# Patient Record
Sex: MALE | Race: WHITE | Employment: UNEMPLOYED | URBAN - METROPOLITAN AREA
[De-identification: names, ages, dates, MRNs, and addresses within clinical notes are randomized per-mention and may not be internally consistent; named-entity substitution may affect disease eponyms.]

---

## 2018-09-19 ENCOUNTER — HOSPITAL ENCOUNTER (INPATIENT)
Facility: CLINIC | Age: 38
LOS: 2 days | Discharge: HOME | End: 2018-09-22
Attending: EMERGENCY MEDICINE | Admitting: HOSPITALIST
Payer: MEDICAID

## 2018-09-19 DIAGNOSIS — F10.239 ALCOHOL DEPENDENCE WITH WITHDRAWAL WITH COMPLICATION (H): ICD-10-CM

## 2018-09-19 PROCEDURE — 84100 ASSAY OF PHOSPHORUS: CPT | Performed by: EMERGENCY MEDICINE

## 2018-09-19 PROCEDURE — 85025 COMPLETE CBC W/AUTO DIFF WBC: CPT | Performed by: EMERGENCY MEDICINE

## 2018-09-19 PROCEDURE — 84443 ASSAY THYROID STIM HORMONE: CPT | Performed by: EMERGENCY MEDICINE

## 2018-09-19 PROCEDURE — 84481 FREE ASSAY (FT-3): CPT | Performed by: EMERGENCY MEDICINE

## 2018-09-19 PROCEDURE — 84439 ASSAY OF FREE THYROXINE: CPT | Performed by: EMERGENCY MEDICINE

## 2018-09-19 PROCEDURE — 80320 DRUG SCREEN QUANTALCOHOLS: CPT | Performed by: EMERGENCY MEDICINE

## 2018-09-19 PROCEDURE — 99285 EMERGENCY DEPT VISIT HI MDM: CPT | Mod: 25

## 2018-09-19 PROCEDURE — 80053 COMPREHEN METABOLIC PANEL: CPT | Performed by: EMERGENCY MEDICINE

## 2018-09-19 PROCEDURE — 83735 ASSAY OF MAGNESIUM: CPT | Performed by: EMERGENCY MEDICINE

## 2018-09-19 NOTE — IP AVS SNAPSHOT
[facility]    [address] [place] [address]    Phone:  [phone]                                       After Visit Summary   9/19/2018    Shabbir Antonio    MRN: [id number]           After Visit Summary Signature Page     I have received my discharge instructions, and my questions have been answered. I have discussed any challenges I see with this plan with the nurse or doctor.    ..........................................................................................................................................  Patient/Patient Representative Signature      ..........................................................................................................................................  Patient Representative Print Name and Relationship to Patient    ..................................................               ................................................  Date                                   Time    ..........................................................................................................................................  Reviewed by Signature/Title    ...................................................              ..............................................  Date                                               Time          22EPIC Rev 08/18

## 2018-09-20 ENCOUNTER — APPOINTMENT (OUTPATIENT)
Dept: GENERAL RADIOLOGY | Facility: CLINIC | Age: 38
End: 2018-09-20
Attending: HOSPITALIST
Payer: MEDICAID

## 2018-09-20 PROBLEM — F10.932 ALCOHOL WITHDRAWAL HALLUCINOSIS (H): Status: ACTIVE | Noted: 2018-09-20

## 2018-09-20 LAB
ALBUMIN SERPL-MCNC: 4.5 G/DL (ref 3.4–5)
ALP SERPL-CCNC: 62 U/L (ref 40–150)
ALT SERPL W P-5'-P-CCNC: 74 U/L (ref 0–70)
AMPHETAMINES UR QL SCN: NEGATIVE
ANION GAP SERPL CALCULATED.3IONS-SCNC: 7 MMOL/L (ref 3–14)
ANION GAP SERPL CALCULATED.3IONS-SCNC: 9 MMOL/L (ref 3–14)
AST SERPL W P-5'-P-CCNC: 67 U/L (ref 0–45)
BARBITURATES UR QL: NEGATIVE
BASOPHILS # BLD AUTO: 0.1 10E9/L (ref 0–0.2)
BASOPHILS NFR BLD AUTO: 0.5 %
BENZODIAZ UR QL: NEGATIVE
BILIRUB SERPL-MCNC: 0.9 MG/DL (ref 0.2–1.3)
BUN SERPL-MCNC: 9 MG/DL (ref 7–30)
CALCIUM SERPL-MCNC: 9.1 MG/DL (ref 8.5–10.1)
CANNABINOIDS UR QL SCN: NEGATIVE
CHLORIDE SERPL-SCNC: 101 MMOL/L (ref 94–109)
CHLORIDE SERPL-SCNC: 105 MMOL/L (ref 94–109)
CO2 SERPL-SCNC: 27 MMOL/L (ref 20–32)
CO2 SERPL-SCNC: 29 MMOL/L (ref 20–32)
COCAINE UR QL: NEGATIVE
CREAT SERPL-MCNC: 1.04 MG/DL (ref 0.66–1.25)
CREAT SERPL-MCNC: 1.1 MG/DL (ref 0.66–1.25)
DIFFERENTIAL METHOD BLD: ABNORMAL
EOSINOPHIL # BLD AUTO: 0.3 10E9/L (ref 0–0.7)
EOSINOPHIL NFR BLD AUTO: 2.5 %
ERYTHROCYTE [DISTWIDTH] IN BLOOD BY AUTOMATED COUNT: 12.3 % (ref 10–15)
ETHANOL SERPL-MCNC: <0.01 G/DL
GFR SERPL CREATININE-BSD FRML MDRD: 74 ML/MIN/1.7M2
GFR SERPL CREATININE-BSD FRML MDRD: 79 ML/MIN/1.7M2
GLUCOSE SERPL-MCNC: 109 MG/DL (ref 70–99)
HCT VFR BLD AUTO: 46.3 % (ref 40–53)
HGB BLD-MCNC: 16.5 G/DL (ref 13.3–17.7)
IMM GRANULOCYTES # BLD: 0 10E9/L (ref 0–0.4)
IMM GRANULOCYTES NFR BLD: 0.3 %
LYMPHOCYTES # BLD AUTO: 3 10E9/L (ref 0.8–5.3)
LYMPHOCYTES NFR BLD AUTO: 25.1 %
MAGNESIUM SERPL-MCNC: 1.9 MG/DL (ref 1.6–2.3)
MCH RBC QN AUTO: 33.9 PG (ref 26.5–33)
MCHC RBC AUTO-ENTMCNC: 35.6 G/DL (ref 31.5–36.5)
MCV RBC AUTO: 95 FL (ref 78–100)
MONOCYTES # BLD AUTO: 0.8 10E9/L (ref 0–1.3)
MONOCYTES NFR BLD AUTO: 6.5 %
NEUTROPHILS # BLD AUTO: 7.7 10E9/L (ref 1.6–8.3)
NEUTROPHILS NFR BLD AUTO: 65.1 %
NRBC # BLD AUTO: 0 10*3/UL
NRBC BLD AUTO-RTO: 0 /100
OPIATES UR QL SCN: NEGATIVE
PCP UR QL SCN: NEGATIVE
PHOSPHATE SERPL-MCNC: 3.2 MG/DL (ref 2.5–4.5)
PLATELET # BLD AUTO: 129 10E9/L (ref 150–450)
PLATELET # BLD AUTO: 200 10E9/L (ref 150–450)
POTASSIUM SERPL-SCNC: 3.5 MMOL/L (ref 3.4–5.3)
POTASSIUM SERPL-SCNC: 3.6 MMOL/L (ref 3.4–5.3)
PROT SERPL-MCNC: 8.6 G/DL (ref 6.8–8.8)
RBC # BLD AUTO: 4.87 10E12/L (ref 4.4–5.9)
SODIUM SERPL-SCNC: 137 MMOL/L (ref 133–144)
SODIUM SERPL-SCNC: 141 MMOL/L (ref 133–144)
T3FREE SERPL-MCNC: 4.4 PG/ML (ref 2.3–4.2)
T4 FREE SERPL-MCNC: 0.84 NG/DL (ref 0.76–1.46)
TSH SERPL DL<=0.005 MIU/L-ACNC: 7.13 MU/L (ref 0.4–4)
WBC # BLD AUTO: 11.8 10E9/L (ref 4–11)

## 2018-09-20 PROCEDURE — 25000132 ZZH RX MED GY IP 250 OP 250 PS 637: Performed by: HOSPITALIST

## 2018-09-20 PROCEDURE — 80307 DRUG TEST PRSMV CHEM ANLYZR: CPT | Performed by: HOSPITALIST

## 2018-09-20 PROCEDURE — 96361 HYDRATE IV INFUSION ADD-ON: CPT

## 2018-09-20 PROCEDURE — 25000132 ZZH RX MED GY IP 250 OP 250 PS 637: Performed by: EMERGENCY MEDICINE

## 2018-09-20 PROCEDURE — 25000128 H RX IP 250 OP 636: Performed by: EMERGENCY MEDICINE

## 2018-09-20 PROCEDURE — 93010 ELECTROCARDIOGRAM REPORT: CPT | Performed by: INTERNAL MEDICINE

## 2018-09-20 PROCEDURE — 80051 ELECTROLYTE PANEL: CPT | Performed by: HOSPITALIST

## 2018-09-20 PROCEDURE — 96374 THER/PROPH/DIAG INJ IV PUSH: CPT

## 2018-09-20 PROCEDURE — 99223 1ST HOSP IP/OBS HIGH 75: CPT | Mod: AI | Performed by: HOSPITALIST

## 2018-09-20 PROCEDURE — 85049 AUTOMATED PLATELET COUNT: CPT | Performed by: HOSPITALIST

## 2018-09-20 PROCEDURE — 12000000 ZZH R&B MED SURG/OB

## 2018-09-20 PROCEDURE — 36415 COLL VENOUS BLD VENIPUNCTURE: CPT | Performed by: HOSPITALIST

## 2018-09-20 PROCEDURE — 82565 ASSAY OF CREATININE: CPT | Performed by: HOSPITALIST

## 2018-09-20 PROCEDURE — 25000128 H RX IP 250 OP 636: Performed by: HOSPITALIST

## 2018-09-20 PROCEDURE — 93005 ELECTROCARDIOGRAM TRACING: CPT

## 2018-09-20 PROCEDURE — 71046 X-RAY EXAM CHEST 2 VIEWS: CPT

## 2018-09-20 PROCEDURE — 96375 TX/PRO/DX INJ NEW DRUG ADDON: CPT

## 2018-09-20 PROCEDURE — 96376 TX/PRO/DX INJ SAME DRUG ADON: CPT

## 2018-09-20 PROCEDURE — 99221 1ST HOSP IP/OBS SF/LOW 40: CPT | Performed by: PSYCHIATRY & NEUROLOGY

## 2018-09-20 RX ORDER — BISACODYL 10 MG
10 SUPPOSITORY, RECTAL RECTAL DAILY PRN
Status: DISCONTINUED | OUTPATIENT
Start: 2018-09-20 | End: 2018-09-22 | Stop reason: HOSPADM

## 2018-09-20 RX ORDER — SODIUM CHLORIDE 9 MG/ML
INJECTION, SOLUTION INTRAVENOUS CONTINUOUS
Status: DISCONTINUED | OUTPATIENT
Start: 2018-09-20 | End: 2018-09-20

## 2018-09-20 RX ORDER — MULTIPLE VITAMINS W/ MINERALS TAB 9MG-400MCG
1 TAB ORAL DAILY
Status: DISCONTINUED | OUTPATIENT
Start: 2018-09-20 | End: 2018-09-22 | Stop reason: HOSPADM

## 2018-09-20 RX ORDER — FOLIC ACID 1 MG/1
1 TABLET ORAL DAILY
Status: DISCONTINUED | OUTPATIENT
Start: 2018-09-20 | End: 2018-09-22 | Stop reason: HOSPADM

## 2018-09-20 RX ORDER — PROCHLORPERAZINE 25 MG
25 SUPPOSITORY, RECTAL RECTAL EVERY 12 HOURS PRN
Status: DISCONTINUED | OUTPATIENT
Start: 2018-09-20 | End: 2018-09-20

## 2018-09-20 RX ORDER — POTASSIUM CHLORIDE 7.45 MG/ML
10 INJECTION INTRAVENOUS
Status: DISCONTINUED | OUTPATIENT
Start: 2018-09-20 | End: 2018-09-22 | Stop reason: HOSPADM

## 2018-09-20 RX ORDER — ONDANSETRON 2 MG/ML
4 INJECTION INTRAMUSCULAR; INTRAVENOUS ONCE
Status: DISCONTINUED | OUTPATIENT
Start: 2018-09-20 | End: 2018-09-20

## 2018-09-20 RX ORDER — ENALAPRIL MALEATE 20 MG/1
20 TABLET ORAL DAILY
COMMUNITY

## 2018-09-20 RX ORDER — LIDOCAINE 40 MG/G
CREAM TOPICAL
Status: DISCONTINUED | OUTPATIENT
Start: 2018-09-20 | End: 2018-09-22 | Stop reason: HOSPADM

## 2018-09-20 RX ORDER — LORAZEPAM 2 MG/ML
2 INJECTION INTRAMUSCULAR
Status: DISCONTINUED | OUTPATIENT
Start: 2018-09-20 | End: 2018-09-20

## 2018-09-20 RX ORDER — POTASSIUM CHLORIDE 1.5 G/1.58G
20-40 POWDER, FOR SOLUTION ORAL
Status: DISCONTINUED | OUTPATIENT
Start: 2018-09-20 | End: 2018-09-22 | Stop reason: HOSPADM

## 2018-09-20 RX ORDER — DIAZEPAM 10 MG/2ML
5-10 INJECTION, SOLUTION INTRAMUSCULAR; INTRAVENOUS EVERY 30 MIN PRN
Status: DISCONTINUED | OUTPATIENT
Start: 2018-09-20 | End: 2018-09-22 | Stop reason: HOSPADM

## 2018-09-20 RX ORDER — LANOLIN ALCOHOL/MO/W.PET/CERES
100 CREAM (GRAM) TOPICAL ONCE
Status: COMPLETED | OUTPATIENT
Start: 2018-09-20 | End: 2018-09-20

## 2018-09-20 RX ORDER — ONDANSETRON 2 MG/ML
INJECTION INTRAMUSCULAR; INTRAVENOUS
Status: DISCONTINUED
Start: 2018-09-20 | End: 2018-09-20 | Stop reason: WASHOUT

## 2018-09-20 RX ORDER — MAGNESIUM SULFATE HEPTAHYDRATE 40 MG/ML
4 INJECTION, SOLUTION INTRAVENOUS EVERY 4 HOURS PRN
Status: DISCONTINUED | OUTPATIENT
Start: 2018-09-20 | End: 2018-09-22 | Stop reason: HOSPADM

## 2018-09-20 RX ORDER — FLUOXETINE 10 MG/1
10 CAPSULE ORAL DAILY
Status: DISCONTINUED | OUTPATIENT
Start: 2018-09-21 | End: 2018-09-22 | Stop reason: HOSPADM

## 2018-09-20 RX ORDER — MULTIPLE VITAMINS W/ MINERALS TAB 9MG-400MCG
1 TAB ORAL ONCE
Status: COMPLETED | OUTPATIENT
Start: 2018-09-20 | End: 2018-09-20

## 2018-09-20 RX ORDER — POTASSIUM CHLORIDE 1500 MG/1
20-40 TABLET, EXTENDED RELEASE ORAL
Status: DISCONTINUED | OUTPATIENT
Start: 2018-09-20 | End: 2018-09-22 | Stop reason: HOSPADM

## 2018-09-20 RX ORDER — MAGNESIUM SULFATE HEPTAHYDRATE 40 MG/ML
2 INJECTION, SOLUTION INTRAVENOUS DAILY PRN
Status: DISCONTINUED | OUTPATIENT
Start: 2018-09-20 | End: 2018-09-22 | Stop reason: HOSPADM

## 2018-09-20 RX ORDER — AMOXICILLIN 250 MG
1 CAPSULE ORAL 2 TIMES DAILY PRN
Status: DISCONTINUED | OUTPATIENT
Start: 2018-09-20 | End: 2018-09-22 | Stop reason: HOSPADM

## 2018-09-20 RX ORDER — FLUOXETINE 10 MG/1
10 CAPSULE ORAL DAILY
COMMUNITY

## 2018-09-20 RX ORDER — AMOXICILLIN 250 MG
2 CAPSULE ORAL 2 TIMES DAILY PRN
Status: DISCONTINUED | OUTPATIENT
Start: 2018-09-20 | End: 2018-09-22 | Stop reason: HOSPADM

## 2018-09-20 RX ORDER — FOLIC ACID 1 MG/1
1 TABLET ORAL ONCE
Status: COMPLETED | OUTPATIENT
Start: 2018-09-20 | End: 2018-09-20

## 2018-09-20 RX ORDER — ACETAMINOPHEN 325 MG/1
650 TABLET ORAL EVERY 4 HOURS PRN
Status: DISCONTINUED | OUTPATIENT
Start: 2018-09-20 | End: 2018-09-22 | Stop reason: HOSPADM

## 2018-09-20 RX ORDER — NALOXONE HYDROCHLORIDE 0.4 MG/ML
.1-.4 INJECTION, SOLUTION INTRAMUSCULAR; INTRAVENOUS; SUBCUTANEOUS
Status: DISCONTINUED | OUTPATIENT
Start: 2018-09-20 | End: 2018-09-22 | Stop reason: HOSPADM

## 2018-09-20 RX ORDER — LANOLIN ALCOHOL/MO/W.PET/CERES
100 CREAM (GRAM) TOPICAL DAILY
Status: DISCONTINUED | OUTPATIENT
Start: 2018-09-20 | End: 2018-09-22 | Stop reason: HOSPADM

## 2018-09-20 RX ORDER — ONDANSETRON 4 MG/1
4 TABLET, ORALLY DISINTEGRATING ORAL EVERY 6 HOURS PRN
Status: DISCONTINUED | OUTPATIENT
Start: 2018-09-20 | End: 2018-09-22 | Stop reason: HOSPADM

## 2018-09-20 RX ORDER — DIAZEPAM 5 MG
10 TABLET ORAL EVERY 30 MIN PRN
Status: DISCONTINUED | OUTPATIENT
Start: 2018-09-20 | End: 2018-09-22 | Stop reason: HOSPADM

## 2018-09-20 RX ORDER — POTASSIUM CHLORIDE 29.8 MG/ML
20 INJECTION INTRAVENOUS
Status: DISCONTINUED | OUTPATIENT
Start: 2018-09-20 | End: 2018-09-22 | Stop reason: HOSPADM

## 2018-09-20 RX ORDER — LORAZEPAM 2 MG/ML
2 INJECTION INTRAMUSCULAR ONCE
Status: COMPLETED | OUTPATIENT
Start: 2018-09-20 | End: 2018-09-20

## 2018-09-20 RX ORDER — SODIUM CHLORIDE, SODIUM LACTATE, POTASSIUM CHLORIDE, CALCIUM CHLORIDE 600; 310; 30; 20 MG/100ML; MG/100ML; MG/100ML; MG/100ML
1000 INJECTION, SOLUTION INTRAVENOUS CONTINUOUS
Status: DISCONTINUED | OUTPATIENT
Start: 2018-09-20 | End: 2018-09-21

## 2018-09-20 RX ORDER — POTASSIUM CL/LIDO/0.9 % NACL 10MEQ/0.1L
10 INTRAVENOUS SOLUTION, PIGGYBACK (ML) INTRAVENOUS
Status: DISCONTINUED | OUTPATIENT
Start: 2018-09-20 | End: 2018-09-22 | Stop reason: HOSPADM

## 2018-09-20 RX ORDER — PROCHLORPERAZINE MALEATE 5 MG
10 TABLET ORAL EVERY 6 HOURS PRN
Status: DISCONTINUED | OUTPATIENT
Start: 2018-09-20 | End: 2018-09-20

## 2018-09-20 RX ORDER — QUETIAPINE FUMARATE 25 MG/1
25-50 TABLET, FILM COATED ORAL EVERY 6 HOURS PRN
Status: DISCONTINUED | OUTPATIENT
Start: 2018-09-20 | End: 2018-09-22 | Stop reason: HOSPADM

## 2018-09-20 RX ORDER — ONDANSETRON 2 MG/ML
4 INJECTION INTRAMUSCULAR; INTRAVENOUS EVERY 6 HOURS PRN
Status: DISCONTINUED | OUTPATIENT
Start: 2018-09-20 | End: 2018-09-22 | Stop reason: HOSPADM

## 2018-09-20 RX ADMIN — MAGNESIUM SULFATE IN WATER 2 G: 40 INJECTION, SOLUTION INTRAVENOUS at 11:14

## 2018-09-20 RX ADMIN — LORAZEPAM 2 MG: 2 INJECTION INTRAMUSCULAR; INTRAVENOUS at 00:36

## 2018-09-20 RX ADMIN — FOLIC ACID 1 MG: 1 TABLET ORAL at 00:35

## 2018-09-20 RX ADMIN — QUETIAPINE FUMARATE 25 MG: 25 TABLET ORAL at 20:44

## 2018-09-20 RX ADMIN — LORAZEPAM 2 MG: 2 INJECTION INTRAMUSCULAR; INTRAVENOUS at 02:03

## 2018-09-20 RX ADMIN — Medication 100 MG: at 00:35

## 2018-09-20 RX ADMIN — Medication 100 MG: at 08:54

## 2018-09-20 RX ADMIN — SODIUM CHLORIDE, POTASSIUM CHLORIDE, SODIUM LACTATE AND CALCIUM CHLORIDE 1000 ML: 600; 310; 30; 20 INJECTION, SOLUTION INTRAVENOUS at 00:36

## 2018-09-20 RX ADMIN — MULTIPLE VITAMINS W/ MINERALS TAB 1 TABLET: TAB at 00:35

## 2018-09-20 RX ADMIN — ENOXAPARIN SODIUM 40 MG: 40 INJECTION SUBCUTANEOUS at 08:55

## 2018-09-20 RX ADMIN — POTASSIUM CHLORIDE 20 MEQ: 1500 TABLET, EXTENDED RELEASE ORAL at 09:01

## 2018-09-20 RX ADMIN — FOLIC ACID 1 MG: 1 TABLET ORAL at 08:54

## 2018-09-20 RX ADMIN — MULTIPLE VITAMINS W/ MINERALS TAB 1 TABLET: TAB at 08:54

## 2018-09-20 RX ADMIN — SODIUM CHLORIDE: 9 INJECTION, SOLUTION INTRAVENOUS at 03:45

## 2018-09-20 ASSESSMENT — ENCOUNTER SYMPTOMS
HALLUCINATIONS: 1
VOMITING: 0
NAUSEA: 1

## 2018-09-20 ASSESSMENT — LIFESTYLE VARIABLES
ANXIETY: NO ANXIETY, AT EASE
AGITATION: NORMAL ACTIVITY
NAUSEA AND VOMITING: NO NAUSEA AND NO VOMITING
TOTAL SCORE: 0
VISUAL DISTURBANCES: NOT PRESENT
TREMOR: NO TREMOR
AUDITORY DISTURBANCES: NOT PRESENT
HEADACHE, FULLNESS IN HEAD: NONE PRESENT
PAROXYSMAL SWEATS: NO SWEAT VISIBLE
ORIENTATION AND CLOUDING OF SENSORIUM: ORIENTED AND CAN DO SERIAL ADDITIONS

## 2018-09-20 ASSESSMENT — ACTIVITIES OF DAILY LIVING (ADL)
ADLS_ACUITY_SCORE: 10

## 2018-09-20 NOTE — ED NOTES
Pt was transported to floor via ERT via w/c. Pt's belongings were also transported with the pt to the floor and pt's paperwork as well.

## 2018-09-20 NOTE — PLAN OF CARE
Problem: Patient Care Overview  Goal: Plan of Care/Patient Progress Review  Outcome: Improving  Patient had VSS, denied any chest pain and SOB. Patient's CIWA scores were 1 and 1, for slight tremors. Denies any hallucinations at this time. Patient had CXR. K+, Magnesium replaced. Recheck tomorrow AM. SBA/FR. Continue to monitor.

## 2018-09-20 NOTE — ED NOTES
[facility]  ED Nurse Handoff Report    ED Chief complaint: Hallucinations (Pt has been drinking for past few days, states he is having seizure like acitivity and hallucinations. Unwilling to share hallucinations. )      ED Diagnosis:   Final diagnoses:   Alcohol dependence with withdrawal with complication (H)       Code Status: Full Code    Allergies: No Known Allergies    Activity level - Baseline/Home:  Independent    Activity Level - Current:   Stand with Assist     Needed?: No    Isolation: No  Infection: Not Applicable  Bariatric?: No    Vital Signs:   Vitals:    09/20/18 0039 09/20/18 0043 09/20/18 0044 09/20/18 0124   BP:  137/89     Resp: 17  14 16   Temp:       TempSrc:       SpO2: 97%  97% 97%   Weight:       Height:           Cardiac Rhythm: ,        Pain level:      Is this patient confused?: No   [place] - Suicide Severity Rating Scale Completed?  Yes  If yes, what color did the patient score?  White    Patient Report: Initial Complaint: Pt has been drinking for past few days, states he is having seizure like acitivity and hallucinations. Unwilling to share hallucinations  Focused Assessment: alcohol withdrawal, visual hallucinations  Tests Performed: labs,   Abnormal Results:   Results for orders placed or performed during the hospital encounter of 09/19/18 (from the past 24 hour(s))   Alcohol ethyl   Result Value Ref Range    Ethanol g/dL <0.01 <0.01 g/dL   CBC with platelets differential   Result Value Ref Range    WBC 11.8 (H) 4.0 - 11.0 10e9/L    RBC Count 4.87 4.4 - 5.9 10e12/L    Hemoglobin 16.5 13.3 - 17.7 g/dL    Hematocrit 46.3 40.0 - 53.0 %    MCV 95 78 - 100 fl    MCH 33.9 (H) 26.5 - 33.0 pg    MCHC 35.6 31.5 - 36.5 g/dL    RDW 12.3 10.0 - 15.0 %    Platelet Count 200 150 - 450 10e9/L    Diff Method Automated Method     % Neutrophils 65.1 %    % Lymphocytes 25.1 %    % Monocytes 6.5 %    % Eosinophils 2.5 %    % Basophils 0.5 %    % Immature Granulocytes 0.3 %     Nucleated RBCs 0 0 /100    Absolute Neutrophil 7.7 1.6 - 8.3 10e9/L    Absolute Lymphocytes 3.0 0.8 - 5.3 10e9/L    Absolute Monocytes 0.8 0.0 - 1.3 10e9/L    Absolute Eosinophils 0.3 0.0 - 0.7 10e9/L    Absolute Basophils 0.1 0.0 - 0.2 10e9/L    Abs Immature Granulocytes 0.0 0 - 0.4 10e9/L    Absolute Nucleated RBC 0.0    Comprehensive metabolic panel   Result Value Ref Range    Sodium 137 133 - 144 mmol/L    Potassium 3.5 3.4 - 5.3 mmol/L    Chloride 101 94 - 109 mmol/L    Carbon Dioxide 27 20 - 32 mmol/L    Anion Gap 9 3 - 14 mmol/L    Glucose 109 (H) 70 - 99 mg/dL    Urea Nitrogen 9 7 - 30 mg/dL    Creatinine 1.10 0.66 - 1.25 mg/dL    GFR Estimate 74 >60 mL/min/1.7m2    GFR Estimate If Black 90 >60 mL/min/1.7m2    Calcium 9.1 8.5 - 10.1 mg/dL    Bilirubin Total 0.9 0.2 - 1.3 mg/dL    Albumin 4.5 3.4 - 5.0 g/dL    Protein Total 8.6 6.8 - 8.8 g/dL    Alkaline Phosphatase 62 40 - 150 U/L    ALT 74 (H) 0 - 70 U/L    AST 67 (H) 0 - 45 U/L     Treatments provided: ativan 2mg x2, folic acid, thiamine, vitamin b, 1 liter LR.  Pt denies feeling suicidal or homicidal. Pt is calm and cooperative    Family Comments:     OBS brochure/video discussed/provided to patient: N/A    ED Medications:   Medications   lactated ringers BOLUS 1,000 mL (0 mLs Intravenous Stopped 9/20/18 0127)     Followed by   lactated ringers infusion (not administered)   LORazepam (ATIVAN) injection 2 mg (2 mg Intravenous Given 9/20/18 0036)   LORazepam (ATIVAN) injection 2 mg (not administered)   thiamine tablet 100 mg (100 mg Oral Given 9/20/18 0035)   folic acid (FOLVITE) tablet 1 mg (1 mg Oral Given 9/20/18 0035)   multivitamin, therapeutic with minerals (THERA-VIT-M) tablet 1 tablet (1 tablet Oral Given 9/20/18 0035)       Drips infusing?:  No    For the majority of the shift this patient was Green.   Interventions performed were .    Severe Sepsis OR Septic Shock Diagnosis Present: No      ED NURSE PHONE NUMBER: [phone]

## 2018-09-20 NOTE — PROGRESS NOTES
SW:  D:  Psychiatry note reviewed.  Writer has left a message with Domitila in the [facility] Office asking her to check if patient's [place] [employer] is still active.  Once this is known writer will either ask CD to see or give patient Rule 25 information.

## 2018-09-20 NOTE — ED PROVIDER NOTES
"  History     Chief Complaint:  Hallucinations       HPI   Shabbir Antonio is a 38 year old male who presents with hallucinations. The patient states that he moved to [place] from [place] recently. 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 Typically the patient states that he drinks a few beers and liquor every couple of days. However, over the last few days he has been drinking more. His last drink was a half of a beer just prior to presentation. He came to the ED today as he has been having \"visual distortions\" and has been hearing \"musical sound.\" The patient states that he has been anxious and is concerned that he is in withdrawal. The patient has had slight nausea but denies any vomiting. He has never attended treatment for his alcohol use in the past. The patient notes that he does have one close friend in the area and has spoken to them today about attending AA. The patient began smoking a few months ago and did use marijuana in the past, but denies any recent use or other illicit drug use. He denies any suicidal or homicidal ideation.     Allergies:  No known drug allergies.     Medications:    Enalapril Maleate  Prozac     Past Medical History:    History reviewed.  No significant past medical history.      Past Surgical History:    History reviewed. No pertinent past surgical history.     Family History:    History reviewed. No pertinent family history.     Social History:  Marital Status: Single  Presents to the ED alone  Tobacco Use: Yes  Alcohol Use: Yes     Review of Systems   Gastrointestinal: Positive for nausea. Negative for vomiting.   Neurological: Negative for syncope.   Psychiatric/Behavioral: Positive for hallucinations. Negative for suicidal ideas.   All other systems reviewed and are negative.      Physical Exam   First Vitals:  BP: (!) 193/177  Heart Rate: 117  Temp: " "98.5  F (36.9  C)  Resp: 16  Height: 185.4 cm (6' 1\")  Weight: 97.1 kg (214 lb)  SpO2: 100 %      Physical Exam  Gen: Pleasant, appears stated age.    Eye:   Pupils are equal, round, and reactive.     Sclera non-injected.    ENT:   Moist mucus membranes.     Normal tongue.    Oropharynx without lesions.   Tongue fasciculations.    Cardiac:     Tachycardic but regular rhythm.    No murmurs, gallops, or rubs.    Pulmonary:     Clear to auscultation bilaterally.    No wheezes, rales, or rhonchi.    Abdomen:     Normal active bowel sounds.     Abdomen is soft and non-distended, without focal tenderness.    Musculoskeletal:     Normal movement of all extremities without evidence for deficit.    Extremities:    No edema.    Skin:   Warm and dry.    Neurologic:    Non-focal exam without asymmetric weakness or numbness.    Fine tremors.    Psychiatric:     Anxious.    Emergency Department Course   Laboratory:  CBC:  WBC 11.8 (H), HGB 16.5,    CMP: Glucose 109 (H), AST 67 (H), ALT 74 (H), otherwise WNL (Creatinine 1.10)   (2349) Alcohol Ethyl: <0.01    Interventions:  (0035) Thiamine 100 mg, PO  (0035) Folvite, 1 mg, PO   (0035) Multivitamin, 1 Tablet, PO   (0036) Normal Saline, 1 liter, IV bolus   (0036) Ativan, 2 mg, IV   (0203) Ativan, 2 mg, IV     Emergency Department Course:  Nursing notes and vitals reviewed.  (0025) I performed an exam of the patient as documented above.    A peripheral IV was established. Blood was drawn from the patient. This was sent for laboratory testing, findings above.    (0147) I rechecked on the patient. Heart rate is improved, but he is still complaining of some visual hallucinations.   Findings and plan explained to the patient who consents to admission.   (0159) I discussed the patient with Dr. Leon of the hospitalist service, who will admit the patient to a telemetry bed for further monitoring, evaluation, and treatment.     Impression & Plan      Medical Decision Making:  Shabbir" Faustino Antonio is a 38 year old male with a history of chronic alcohol abuse who presents with  Hallucinations and tremors. On exam, the patient is tachycardic and hypertensive. He has other exam findings to suggest acute alcohol withdrawal. At this stage, he seems to have partially responded to IV ativan though continues to say that he is having some hallucinations and tremulous. Vitals are improved. He is still awake and sating well after 2 mg of ativan. Additional ativan was ordered given persistent symptoms. Otherwise, patient has mild transaminitis likely related to chronic alcohol abuse. He will be admitted for continued treatment of alcohol withdrawal. He is stable for a [facility] bed at this time.        Diagnosis:    ICD-10-CM    1. Alcohol dependence with withdrawal with complication (H) F10.239        Disposition:  Admitted to inpatient medical bed.          Trina PRESSLEY, am serving as a scribe on 9/20/2018 at 12:25 AM to personally document services performed by Dr. Eckert based on my observations and the provider's statements to me.     9/19/2018    EMERGENCY DEPARTMENT       Sherly Eckert MD  09/20/18 0252

## 2018-09-20 NOTE — CONSULTS
"Consult Date:  09/20/2018      REASON FOR CONSULTATION:  Hallucinations, in the context of alcohol withdrawal.      REQUESTING PHYSICIAN:  Prakash Leon MD      IDENTIFYING DATA:  The patient is a 38-year-old, single,  male, who recently moved back to [place] from [place] and came in acknowledging auditory and visual hallucinations in the context of heavy drinking and possible withdrawal.      CHIEF COMPLAINT:  \"I have been drinking on and off.\"  The patient is a 38-year-old gentleman with a notable history of an untreated alcohol use disorder.  He has been living in the [facility], having moved back to [place] to look for work.  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  He says he stopped drinking the day prior to admission and became tremulous and started having visual and auditory hallucinations.  He was given appropriate doses of thiamine and lorazepam in the emergency room.  He was oriented there.  His labs show alcohol-induced hepatitis, but no other gross abnormalities.  He denies other drug use.  He did mention that he is treated for anxiety and had been taking a small amount of Prozac for this.  It sounds like he drinks hard liquor and beer.  He denies DWIs and says he has never had any formal treatment.  He mentioned that he might have [place] [employer] insurance, but he is not sure.  No insurance is listed on his face sheet.      On further questioning, he denies a convincing history of hypomania, anabelle, panic disorder, obsessive-compulsive disorder, eating disorder history, PTSD, ADHD, or trauma history.  He alludes to having hallucinations, of both visual and auditory nature, where he was hearing music and seeing people arguing and fighting.  When I came into the room, he was sleeping very soundly, having received some benzodiazepines in the ER.  He woke up with a startle, was quite " confused but then able to compose himself and converse with me.  He is currently help seeking.  He did get a total of 4 mg IV lorazepam in the ER, along with normal saline.  He looks a little diaphoretic and shaky, also disheveled.  He is not reporting overt concerns about depression and denies any thoughts of self-harm.      PAST PSYCHIATRIC HISTORY:  He notes being treated for some mild anxiety, using 10 mg of Prozac, which he says was historically helpful.      PAST CHEMICAL DEPENDENCY HISTORY:  Per his admission, he is a heavy drinker, but never went through treatment.  He has been trying to get himself off of alcohol, but started having withdrawal symptoms and hallucinations, which prompted him to come to the ER.  This has happened in the past after he stopped drinking, by his report.      PAST MEDICAL HISTORY:  Notable for hypertension.      PRIOR TO ADMISSION MEDICATIONS:   1.  Prozac 10 mg daily.   2.  Enalapril.      FAMILY HISTORY:  He thinks there are first-degree relatives with alcoholism, but he denies other mental illness.      SOCIAL HISTORY:  The patient is originally from [place].  It sounds like he may have moved a bit and says that he now lives independently in the [facility].  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  I do not believe he has been .  He denies legal problems, denies  history, and does not have any children.  He mentioned having one sister.  It is not entirely clear how reliable he is because he was slightly confused when I initially engaged him this morning.      REVIEW OF SYSTEMS:  A 10-point review of systems is unchanged from Dr. Leon's initial H&P 09/20 at 4:26 a.m.      Most recent vital signs:  Temp 97.9, pulse 89, respiratory rate 20, blood pressure 131/99, oxygen saturation 98%.      MENTAL STATUS EXAMINATION:  Appearance:  The  "patient is a disheveled man, who looks a little wide-eyed with bloodshot eyes.  He is judged to be a fair historian.  Speech is nonpressured, rate and flow normal, use of language appropriate.  Motor exam tremulous.  Coordination, station, and gait not tested.  Muscle strength and tone adequate.  Affect is anxious and somewhat hypervigilant.  Mood \"anxious.\"  Thought process logical, coherent, and goal directed.  No loosening of associations.  No flight of ideas.  No formal thought disorder.  Thought content negative for current hallucinations, delusions, paranoia, suicidal or homicidal ideation, though he acknowledges both visual and auditory hallucinations prior to admission.  Insight and judgment are poor.  Cognitive exam:  The patient is alert and oriented x 1/3.  He thought he was in the emergency room.  He knows the day as Thursday, but had trouble with the month and the date.  Recent memory is poor.  He has some difficulty giving me details about what brought him into the hospital.  Remote memory grossly intact.  Concentration impaired.  General fund of knowledge delayed.      IMPRESSION:  The patient is a 38-year-old gentleman, presenting with an alcohol withdrawal delirium.  I suspect his hallucinations are alcohol-related, but this bears watching.  I would have some p.r.n. Seroquel available.  IV Haldol could be added if he becomes markedly agitated.  They are appropriately utilizing Valium as a withdrawal benzodiazepine, and this is appropriate.  If he has insurance through [employer], then CD assessment would be appropriate, this deserves clarification.  If he does not have insurance that is active, then a Rule 25 evaluation could be offered after he is detoxed and stabilized.  He should probably meet with the .      DIAGNOSES:   1.  Delirium, secondary to alcohol withdrawal.   2.  Alcohol use disorder, severe.   3.  Unspecified anxiety disorder by history.      PLAN:   1.  Medical " detox.   2.  Seroquel p.r.n. is available.   3.  Meet with  to clarify insurance.  Order CD assessment if in fact he has active insurance, though if this is not the case a Rule 25 should be offered.   4.  I will write for his Prozac, which can be restarted tomorrow, 10 mg daily.   5.  Reconsult Psychiatry as needed.         JOYCE HUSTON MD             D: 2018   T: 2018   MT: MARCO      Name:     CHRISTOPHER ENAMORADO   MRN:      [id number]        Account:       [id number]   :      1980           Consult Date:  2018      Document: [id number]

## 2018-09-20 NOTE — PROGRESS NOTES
RECEIVING UNIT ED HANDOFF REVIEW    ED Nurse Handoff Report was reviewed by: David Fan on September 20, 2018 at 2:27 AM

## 2018-09-20 NOTE — PLAN OF CARE
Problem: Patient Care Overview  Goal: Plan of Care/Patient Progress Review  Outcome: No Change  AOx4, flat affect.  VSS on r/a.  CIWA 2, 0.  Denies pain.  Up SBA, regular diet.  Urinal for voids.  Tele NSR.  IVF infusing.

## 2018-09-20 NOTE — H&P
[facility]    History and Physical  Hospitalist       Date of Admission:  9/19/2018  Date of service: 9/20/2018    Assessment & Plan    Shabbir Antonio is a 38 year old male admitted with visual and auditory hallucinations likely due to alcohol withdrawal.  The patient drinks almost daily and has been drinking more than usual over the last few days although he cannot quantify the amount for me.  He stopped drinking yesterday and became tremulous and started having visual and auditory hallucinations.  He came to the emergency room where he was treated with thiamine and lorazepam.  He is currently awake alert and oriented- he is not hallucinating.    Acute alcohol withdrawal syndrome with hallucinations  Alcohol use disorder  Anxiety/depression  The patient's tachycardia improved with lorazepam and is not hallucinating at the moment.  He is going to be admitted and treated for alcohol withdrawal with benzodiazepines.  We will ask the psychiatry service to evaluate him in the morning.    Mild elevation of liver transaminases likely due to alcohol    Hypertension  Hold enalapril for now.    DVT Prophylaxis:   Enoxaparin (Lovenox) subcutaneous    Code Status:   Full Code    Disposition: Expected discharge TBD    Prakash Leon MD    Primary Care Physician   Physician No Ref-Primary    Chief Complaint   Visual and auditory hallucinations    History is obtained from the patient    History of Present Illness   Shabbir Antonio is a 38 year old male who gives a history of hypertension and anxiety.  He moved here from [place] about 3 months ago.  He says he works as an .  [place] around the [place] by car for work.  He drinks alcohol almost daily but will not or cannot quantify the amount for me.  He says that he likes to drink beer but also shots of alcohol with beer, he had been drinking more than usual over the past few days and decided to stop drinking  yesterday.  However he drank half of a beer before he came to the ER.  The patient started to have visual and auditory hallucinations today.  He was hearing music and seeing people arguing and fighting.  He did realize after a few minutes that these were hallucinations.  He is also been feeling a little shaky and anxious.  He denies being admitted to the hospital for alcohol-related issues are having alcohol withdrawal in the past.  He does report at least one other time where he has had similar hallucinations after he stops drinking.  In the emergency room he was awake and alert.  Blood pressure was 193/177 heart rate 117 temperature 98.5 respiratory 16.  Alcohol level was less than 0.01.  AST was 67 ALT 74 but other labs were unremarkable.  He received a total of 4 mg of IV lorazepam, 1 L normal saline, 1 multivitamin tablet, 1 mg folic acid and 100 mg of thiamine.    Past Medical History    I have reviewed this patient's medical history and updated it with pertinent information if needed.   Past Medical History:   Diagnosis Date     Anxiety      HTN (hypertension)        Past Surgical History   I have reviewed this patient's surgical history and updated it with pertinent information if needed.  No past surgical history on file.    Prior to Admission Medications   Prior to Admission Medications   Prescriptions Last Dose Informant Patient Reported? Taking?   ENALAPRIL MALEATE PO   Yes Yes   FLUoxetine HCl (PROZAC PO)   Yes Yes      Facility-Administered Medications: None     Allergies   No Known Allergies    Social History   I have reviewed this patient's social history and updated it with pertinent information if needed. Shabbir Antonio  reports that he has been smoking Cigarettes.  He does not have any smokeless tobacco history on file.    Family History   I have reviewed this patient's family history and updated it with pertinent information if needed.   Family History   Problem Relation Age of Onset      HEART DISEASE Mother      Liver Disease Father      HEART DISEASE Maternal Grandfather      Diabetes No family hx of        Review of Systems   The 10 point Review of Systems is negative other than noted in the HPI or here.     Physical Exam   Temp: 97.9  F (36.6  C) Temp src: Oral BP: (!) 147/106   Heart Rate: 89 Resp: 20 SpO2: 98 % O2 Device: None (Room air)    Vital Signs with Ranges  Temp:  [97.9  F (36.6  C)-98.5  F (36.9  C)] 97.9  F (36.6  C)  Heart Rate:  [] 89  Resp:  [14-23] 20  BP: (137-193)/() 147/106  SpO2:  [97 %-100 %] 98 %  214 lbs 4.59 oz    Constitutional: Awake, alert, cooperative, no apparent distress.  Eyes: Conjunctiva and pupils examined and normal.  HEENT: Moist mucous membranes, normal dentition.  Respiratory: Clear to auscultation bilaterally, no crackles or wheezing.  Cardiovascular: Regular rate and rhythm, normal S1 and S2, and no murmur noted.  GI: Soft, non-distended, non-tender, normal bowel sounds.  Skin: No rashes, no cyanosis, no edema.  Musculoskeletal: No joint swelling, erythema or tenderness.  Neurologic: Cranial nerves 2-12 intact, normal strength and sensation.  He has no tremor.  Psychiatric: Alert, oriented to person, place and time, no hallucinations or delusions noted    Data   Data reviewed today:  I personally reviewed no images or EKG's today.    Recent Labs  Lab 09/19/18  2349   WBC 11.8*   HGB 16.5   MCV 95         POTASSIUM 3.5   CHLORIDE 101   CO2 27   BUN 9   CR 1.10   ANIONGAP 9   CYRIL 9.1   *   ALBUMIN 4.5   PROTTOTAL 8.6   BILITOTAL 0.9   ALKPHOS 62   ALT 74*   AST 67*       No results found for this or any previous visit (from the past 24 hour(s)).

## 2018-09-20 NOTE — PHARMACY-ADMISSION MEDICATION HISTORY
Admission medication history interview status for the 9/19/2018  admission is complete. See EPIC admission navigator for prior to admission medications     Medication history source reliability:Moderate    Actions taken by pharmacist (provider contacted, etc):  Interviewed patient, he could not give me his pharmacy name, says he goes to what ever pharmacy in what ever town he is in. He seemed to know the names, doses of meds he takes.      Additional medication history information not noted on PTA med list :None    Medication reconciliation/reorder completed by provider prior to medication history? No    Time spent in this activity: 15 min    Prior to Admission medications    Medication Sig Last Dose Taking? Auth Provider   enalapril (VASOTEC) 20 MG tablet Take 20 mg by mouth daily 9/19/2018 at Unknown time Yes Unknown, Entered By History   FLUoxetine (PROZAC) 10 MG capsule Take 10 mg by mouth daily 9/19/2018 at Unknown time Yes Unknown, Entered By History

## 2018-09-20 NOTE — PROGRESS NOTES
[facility]    Hospitalist Progress Note    Brief Summary:  Shabbir Antonio is a 38 year old male admitted with visual and auditory hallucinations likely due to alcohol withdrawal.  The patient drinks almost daily and has been drinking more than usual over the last few days although he cannot quantify the amount for me.  He stopped drinking a day before admission and became tremulous and started having visual and auditory hallucinations.  He came to the emergency room where he was treated with thiamine and lorazepam and subsequently admitted.     Assessment & Plan      Acute alcohol withdrawal syndrome with hallucinations  Alcohol use disorder  Anxiety/depression  Alcohol abuse with alcohol withdrawal at this time, continue with IV fluids,  Thiamine, folic acid and multivitamin, check CIWA and give lorazepam  According to CIWA. Current CIWA score is low at this time.      Mild elevation of liver transaminases likely due to alcohol  will follow     Hypertension  Restart his enalapril now.     DVT Prophylaxis:   Enoxaparin (Lovenox) subcutaneous     Code Status:   Full Code    Overall stable.  Consult , patient does want help and wanted to go for treatment if  Needed.  Restart enalapril.   No more hallucinations   Appreciate input from the Psychiatry     Disposition: Expected discharge in 1-2 days depending on his withdrawal     Onur Brown MD  Text Page  (7am - 6pm)    Interval History   Patient was sleeping but did woke up, no more withdrawal symptoms at this time,  No hallucinations.     No other significant event overnight.     -Data reviewed today: I reviewed all new labs and imaging results over the last 24 hours. I personally reviewed no images or EKG's today.    Physical Exam   Temp: 98.2  F (36.8  C) Temp src: Oral BP: 144/90   Heart Rate: 89 Resp: 18 SpO2: 99 % O2 Device: None (Room air)    Vitals:    09/19/18 2330 09/20/18 0244   Weight: 97.1 kg (214 lb) 97.2 kg (214 lb  4.6 oz)     Vital Signs with Ranges  Temp:  [97.9  F (36.6  C)-98.5  F (36.9  C)] 98.2  F (36.8  C)  Heart Rate:  [] 89  Resp:  [14-23] 18  BP: (131-193)/() 144/90  SpO2:  [97 %-100 %] 99 %       Constitutional: awake, alert, cooperative, no apparent distress, and appears stated age  Eyes:  Sclera injected, EOM intact, pupil reactive  Respiratory: No increased work of breathing, good air exchange, clear to auscultation bilaterally, no crackles or wheezing  Cardiovascular: Normal apical impulse, regular rate and rhythm, normal S1 and S2, no S3 or S4, and no murmur noted  GI: No scars, normal bowel sounds, soft, non-distended, non-tender, no masses palpated, no hepatosplenomegally  Musculoskeletal: no lower extremity pitting edema present  Neurologic: no focal deficit.     Medications     lactated ringers       sodium chloride 100 mL/hr at 09/20/18 0345       enoxaparin  40 mg Subcutaneous Q24H     [START ON 9/21/2018] FLUoxetine  10 mg Oral Daily     folic acid  1 mg Oral Daily     multivitamin, therapeutic with minerals  1 tablet Oral Daily     sodium chloride (PF)  3 mL Intracatheter Q8H     thiamine  100 mg Oral Daily       Data     Recent Labs  Lab 09/20/18  0847 09/19/18  2349   WBC  --  11.8*   HGB  --  16.5   MCV  --  95   * 200    137   POTASSIUM 3.6 3.5   CHLORIDE 105 101   CO2 29 27   BUN  --  9   CR 1.04 1.10   ANIONGAP 7 9   CYRIL  --  9.1   GLC  --  109*   ALBUMIN  --  4.5   PROTTOTAL  --  8.6   BILITOTAL  --  0.9   ALKPHOS  --  62   ALT  --  74*   AST  --  67*       No results found for this or any previous visit (from the past 24 hour(s)).

## 2018-09-21 LAB
ALBUMIN SERPL-MCNC: 3.7 G/DL (ref 3.4–5)
ALP SERPL-CCNC: 52 U/L (ref 40–150)
ALT SERPL W P-5'-P-CCNC: 86 U/L (ref 0–70)
ANION GAP SERPL CALCULATED.3IONS-SCNC: 9 MMOL/L (ref 3–14)
AST SERPL W P-5'-P-CCNC: 81 U/L (ref 0–45)
BASOPHILS # BLD AUTO: 0.1 10E9/L (ref 0–0.2)
BASOPHILS NFR BLD AUTO: 0.7 %
BILIRUB SERPL-MCNC: 0.7 MG/DL (ref 0.2–1.3)
BUN SERPL-MCNC: 9 MG/DL (ref 7–30)
CALCIUM SERPL-MCNC: 8.5 MG/DL (ref 8.5–10.1)
CHLORIDE SERPL-SCNC: 104 MMOL/L (ref 94–109)
CO2 SERPL-SCNC: 26 MMOL/L (ref 20–32)
CREAT SERPL-MCNC: 1.02 MG/DL (ref 0.66–1.25)
DIFFERENTIAL METHOD BLD: ABNORMAL
EOSINOPHIL # BLD AUTO: 0.4 10E9/L (ref 0–0.7)
EOSINOPHIL NFR BLD AUTO: 5.2 %
ERYTHROCYTE [DISTWIDTH] IN BLOOD BY AUTOMATED COUNT: 12.3 % (ref 10–15)
GFR SERPL CREATININE-BSD FRML MDRD: 81 ML/MIN/1.7M2
GLUCOSE SERPL-MCNC: 146 MG/DL (ref 70–99)
HCT VFR BLD AUTO: 41.4 % (ref 40–53)
HGB BLD-MCNC: 14.3 G/DL (ref 13.3–17.7)
IMM GRANULOCYTES # BLD: 0 10E9/L (ref 0–0.4)
IMM GRANULOCYTES NFR BLD: 0.1 %
LYMPHOCYTES # BLD AUTO: 2 10E9/L (ref 0.8–5.3)
LYMPHOCYTES NFR BLD AUTO: 29.1 %
MAGNESIUM SERPL-MCNC: 2.2 MG/DL (ref 1.6–2.3)
MCH RBC QN AUTO: 32.9 PG (ref 26.5–33)
MCHC RBC AUTO-ENTMCNC: 34.5 G/DL (ref 31.5–36.5)
MCV RBC AUTO: 95 FL (ref 78–100)
MONOCYTES # BLD AUTO: 0.5 10E9/L (ref 0–1.3)
MONOCYTES NFR BLD AUTO: 6.5 %
NEUTROPHILS # BLD AUTO: 4 10E9/L (ref 1.6–8.3)
NEUTROPHILS NFR BLD AUTO: 58.4 %
NRBC # BLD AUTO: 0 10*3/UL
NRBC BLD AUTO-RTO: 0 /100
PHOSPHATE SERPL-MCNC: 4.5 MG/DL (ref 2.5–4.5)
PLATELET # BLD AUTO: 140 10E9/L (ref 150–450)
POTASSIUM SERPL-SCNC: 3.8 MMOL/L (ref 3.4–5.3)
PROT SERPL-MCNC: 7.1 G/DL (ref 6.8–8.8)
RBC # BLD AUTO: 4.35 10E12/L (ref 4.4–5.9)
SODIUM SERPL-SCNC: 139 MMOL/L (ref 133–144)
WBC # BLD AUTO: 6.9 10E9/L (ref 4–11)

## 2018-09-21 PROCEDURE — 84100 ASSAY OF PHOSPHORUS: CPT | Performed by: HOSPITALIST

## 2018-09-21 PROCEDURE — 36415 COLL VENOUS BLD VENIPUNCTURE: CPT | Performed by: HOSPITALIST

## 2018-09-21 PROCEDURE — 99232 SBSQ HOSP IP/OBS MODERATE 35: CPT | Performed by: INTERNAL MEDICINE

## 2018-09-21 PROCEDURE — 80053 COMPREHEN METABOLIC PANEL: CPT | Performed by: HOSPITALIST

## 2018-09-21 PROCEDURE — 25000132 ZZH RX MED GY IP 250 OP 250 PS 637: Performed by: HOSPITALIST

## 2018-09-21 PROCEDURE — 85025 COMPLETE CBC W/AUTO DIFF WBC: CPT | Performed by: HOSPITALIST

## 2018-09-21 PROCEDURE — 25000132 ZZH RX MED GY IP 250 OP 250 PS 637: Performed by: INTERNAL MEDICINE

## 2018-09-21 PROCEDURE — 83735 ASSAY OF MAGNESIUM: CPT | Performed by: HOSPITALIST

## 2018-09-21 PROCEDURE — 25000132 ZZH RX MED GY IP 250 OP 250 PS 637: Performed by: PSYCHIATRY & NEUROLOGY

## 2018-09-21 PROCEDURE — 12000000 ZZH R&B MED SURG/OB

## 2018-09-21 PROCEDURE — 25000128 H RX IP 250 OP 636: Performed by: HOSPITALIST

## 2018-09-21 RX ORDER — ENALAPRIL MALEATE 20 MG/1
20 TABLET ORAL DAILY
Status: DISCONTINUED | OUTPATIENT
Start: 2018-09-21 | End: 2018-09-22 | Stop reason: HOSPADM

## 2018-09-21 RX ADMIN — POTASSIUM CHLORIDE 20 MEQ: 1500 TABLET, EXTENDED RELEASE ORAL at 09:57

## 2018-09-21 RX ADMIN — Medication 100 MG: at 09:57

## 2018-09-21 RX ADMIN — ENALAPRIL MALEATE 20 MG: 20 TABLET ORAL at 10:02

## 2018-09-21 RX ADMIN — FLUOXETINE 10 MG: 10 CAPSULE ORAL at 09:56

## 2018-09-21 RX ADMIN — QUETIAPINE FUMARATE 25 MG: 25 TABLET ORAL at 13:23

## 2018-09-21 RX ADMIN — MULTIPLE VITAMINS W/ MINERALS TAB 1 TABLET: TAB at 09:55

## 2018-09-21 RX ADMIN — ENOXAPARIN SODIUM 40 MG: 40 INJECTION SUBCUTANEOUS at 09:57

## 2018-09-21 RX ADMIN — FOLIC ACID 1 MG: 1 TABLET ORAL at 10:02

## 2018-09-21 ASSESSMENT — ACTIVITIES OF DAILY LIVING (ADL)
ADLS_ACUITY_SCORE: 10

## 2018-09-21 NOTE — PROGRESS NOTES
[facility]    Hospitalist Progress Note    Brief Summary:  Shabbir Antonio is a 38 year old male admitted with visual and auditory hallucinations likely due to alcohol withdrawal.  The patient drinks almost daily and has been drinking more than usual over the last few days although he cannot quantify the amount for me.  He stopped drinking a day before admission and became tremulous and started having visual and auditory hallucinations.  He came to the emergency room where he was treated with thiamine and lorazepam and subsequently admitted.     Assessment & Plan      Acute alcohol withdrawal syndrome with hallucinations  Alcohol use disorder  Anxiety/depression  No more hallucinations or agitation at this time, mild tremors and anxiety  At this time.   Alcohol abuse with alcohol withdrawal at this time,stop IV fluids now  Thiamine, folic acid and multivitamin, check CIWA and give lorazepam  According to CIWA. Current CIWA score is low at this time.   Need CD evaluation and dependency treatment.  on   Board.      Mild elevation of liver transaminases likely due to alcohol  will follow     Hypertension  On enalapril, blood pressure reasonable at this time.      DVT Prophylaxis:   Enoxaparin (Lovenox) subcutaneous     Code Status:   Full Code    Overall stable.  Consult , patient does want help and wanted to go for treatment if  Needed.  No more hallucinations   Appreciate input from the Psychiatry   Need CD evaluation and treatment for dependency.  If remain stable, will be able to discharge home in AM    Disposition: possible tomorrow.    Onur Brown MD  Text Page  (7am - 6pm)    Interval History   Has some anxiety this morning, mild tremors, still think his mind is not back to its normal state yet, when ask to explain  It further was unable to do it. Denies any fever, chills, nausea, vomiting, headache or dizziness.     No other significant event overnight.     -Data  reviewed today: I reviewed all new labs and imaging results over the last 24 hours. I personally reviewed no images or EKG's today.    Physical Exam   Temp: 98.2  F (36.8  C) Temp src: Oral BP: 140/68 Pulse: 89 Heart Rate: 82 Resp: 16 SpO2: 97 % O2 Device: None (Room air)    Vitals:    09/19/18 2330 09/20/18 0244   Weight: 97.1 kg (214 lb) 97.2 kg (214 lb 4.6 oz)     Vital Signs with Ranges  Temp:  [98.2  F (36.8  C)-98.5  F (36.9  C)] 98.2  F (36.8  C)  Pulse:  [89] 89  Heart Rate:  [75-89] 82  Resp:  [16] 16  BP: (131-153)/() 140/68  SpO2:  [97 %-98 %] 97 %  I/O last 3 completed shifts:  In: 1080 [P.O.:1080]  Out: -     Constitutional: awake, alert, cooperative, no apparent distress, and appears stated age  Eyes:  Sclera injected, EOM intact, pupil reactive  Respiratory: No increased work of breathing, good air exchange, clear to auscultation bilaterally, no crackles or wheezing  Cardiovascular: Normal apical impulse, regular rate and rhythm, normal S1 and S2, no S3 or S4, and no murmur noted  GI: No scars, normal bowel sounds, soft, non-distended, non-tender, no masses palpated, no hepatosplenomegally  Musculoskeletal: no lower extremity pitting edema present  Neurologic: no focal deficit.     Medications       enalapril  20 mg Oral Daily     enoxaparin  40 mg Subcutaneous Q24H     FLUoxetine  10 mg Oral Daily     folic acid  1 mg Oral Daily     multivitamin, therapeutic with minerals  1 tablet Oral Daily     sodium chloride (PF)  3 mL Intracatheter Q8H     thiamine  100 mg Oral Daily       Data     Recent Labs  Lab 09/21/18  0855 09/20/18  0847 09/19/18  2349   WBC 6.9  --  11.8*   HGB 14.3  --  16.5   MCV 95  --  95   * 129* 200    141 137   POTASSIUM 3.8 3.6 3.5   CHLORIDE 104 105 101   CO2 26 29 27   BUN 9  --  9   CR 1.02 1.04 1.10   ANIONGAP 9 7 9   CYRIL 8.5  --  9.1   *  --  109*   ALBUMIN 3.7  --  4.5   PROTTOTAL 7.1  --  8.6   BILITOTAL 0.7  --  0.9   ALKPHOS 52  --  62   ALT 86*   --  74*   AST 81*  --  67*       Recent Results (from the past 24 hour(s))   XR Chest 2 Views    Narrative    CHEST TWO VIEWS  9/20/2018 1:25 PM     HISTORY: HTN.    COMPARISON: None.     FINDINGS:  There are no acute infiltrates. The cardiac silhouette is  not enlarged. Pulmonary vasculature is unremarkable.       Impression    IMPRESSION: No acute disease.    GUERO HERNANDEZ MD

## 2018-09-21 NOTE — DISCHARGE INSTRUCTIONS
"Rule 25 CD assessment:  Call [facility] at [phone] and press option \"0\" to speak with staff who can give you locations for Rule 25 assessment clinics.    Hardy riddle [facility] submitted your [place] [employer] Assistance application.  You can call him to ask what the next step is.  His phone number is [phone].    To locate AA groups near you, you can call [facility]  At [phone] or go on their web site at [email or web address]  "

## 2018-09-21 NOTE — PLAN OF CARE
Problem: Patient Care Overview  Goal: Plan of Care/Patient Progress Review  Outcome: No Change  A/ox4, anxious at times. VSS on RA except elevated BP at times. Denies pain. Tele NSR.  CIWA 1 and 5 for anxiety, slight tremors in bilateral hands, and slightly diaphoretic, patient requested something for feeling anxious,  PRN Seroquel given x1, slightly effective. R PIV noted to be leaking/bloody, flushed adequately and currently SL. Regular diet, good appetite. Up independently in room. Electrolytes replaced on day shift, K+3.6 (on high protocol) and Magnesium 1.9.  recheck tomorrow with morning labs. Discharge pending. Continue to monitor.

## 2018-09-21 NOTE — PROGRESS NOTES
SW:  D:  Per Financial Account note, patient does not have active health insurance.  A MA application has been completed and submitted by the Financial Counselor.  Writer will verbally review the Rule 25 assessment process with patient and enter the phone number for [facility] on his discharge instructions.

## 2018-09-21 NOTE — PLAN OF CARE
Problem: Patient Care Overview  Goal: Plan of Care/Patient Progress Review  Outcome: Improving  7a-7p:A&O x4, up independently. VSS on room air. Tele NSR.  Regular diet.  Denies pain.  CIWA scores 0/2/0. C/o anxiety and prn Seroquel given. PIV saline locked. reported loose BMx1, will monitor. K replacement given, recheck at am. f/u SW for insurance planning. Pt interested in CD tx. Continue to monitor.

## 2018-09-21 NOTE — PLAN OF CARE
Problem: Patient Care Overview  Goal: Plan of Care/Patient Progress Review  Outcome: No Change  A&O x4, up independently.  VSS on room air.  Regular diet.  Denies pain.  CIWA scores 0 and 1 for mild sweating.  PIV saline locked.  Tele NSR.  Continue to monitor.

## 2018-09-22 VITALS
TEMPERATURE: 98.1 F | SYSTOLIC BLOOD PRESSURE: 121 MMHG | OXYGEN SATURATION: 100 % | WEIGHT: 214.29 LBS | DIASTOLIC BLOOD PRESSURE: 69 MMHG | RESPIRATION RATE: 16 BRPM | HEART RATE: 89 BPM | HEIGHT: 73 IN | BODY MASS INDEX: 28.4 KG/M2

## 2018-09-22 LAB
PLATELET # BLD AUTO: 139 10E9/L (ref 150–450)
POTASSIUM SERPL-SCNC: 4.2 MMOL/L (ref 3.4–5.3)

## 2018-09-22 PROCEDURE — 99239 HOSP IP/OBS DSCHRG MGMT >30: CPT | Performed by: INTERNAL MEDICINE

## 2018-09-22 PROCEDURE — 25000132 ZZH RX MED GY IP 250 OP 250 PS 637: Performed by: INTERNAL MEDICINE

## 2018-09-22 PROCEDURE — 25000132 ZZH RX MED GY IP 250 OP 250 PS 637: Performed by: PSYCHIATRY & NEUROLOGY

## 2018-09-22 PROCEDURE — 85049 AUTOMATED PLATELET COUNT: CPT | Performed by: HOSPITALIST

## 2018-09-22 PROCEDURE — 25000132 ZZH RX MED GY IP 250 OP 250 PS 637: Performed by: HOSPITALIST

## 2018-09-22 PROCEDURE — 84132 ASSAY OF SERUM POTASSIUM: CPT | Performed by: HOSPITALIST

## 2018-09-22 PROCEDURE — 36415 COLL VENOUS BLD VENIPUNCTURE: CPT | Performed by: HOSPITALIST

## 2018-09-22 RX ADMIN — Medication 100 MG: at 09:43

## 2018-09-22 RX ADMIN — FLUOXETINE 10 MG: 10 CAPSULE ORAL at 09:43

## 2018-09-22 RX ADMIN — FOLIC ACID 1 MG: 1 TABLET ORAL at 09:43

## 2018-09-22 RX ADMIN — MULTIPLE VITAMINS W/ MINERALS TAB 1 TABLET: TAB at 09:43

## 2018-09-22 RX ADMIN — ENALAPRIL MALEATE 20 MG: 20 TABLET ORAL at 09:43

## 2018-09-22 ASSESSMENT — ACTIVITIES OF DAILY LIVING (ADL)
ADLS_ACUITY_SCORE: 10

## 2018-09-22 NOTE — PLAN OF CARE
Problem: Patient Care Overview  Goal: Plan of Care/Patient Progress Review  Outcome: Adequate for Discharge Date Met: 09/22/18   Discharge    Patient discharged to home via car with friend.  Care plan note: A&O x4, up independently. VSS on room air. Tele NSR.  Regular diet.  Denies pain.  CIWA scores 0. Appears comfortable and calm. f/u by ZANDRA for insurance planning completed. Pt interested in CD tx. Pt d/c'ed home with rule 25 instructions to complete.       Listed belongings gathered and returned to patient. Yes  Care Plan and Patient education resolved: Yes  Prescriptions if needed, hard copies sent with patient  NA  Home and hospital acquired medications returned to patient: NA  Medication Bin checked and emptied on discharge Yes  Follow up appointment made for patient: ZANDRA Woodruff instructed on Rule 25

## 2018-09-22 NOTE — PROGRESS NOTES
SW:  D:  Met with patient at his request to review resources briefly discussed yesterday.  Reviewed the process for patient to obtain a Rule 25 assessment.  Reviewed that his MA application has been submitted by hospitals financial counselor Hardy.  Encouraged patient to call Hardy next week to ask if there is anything else he needs to do.  Discussed his sobriety plan until he gets into a CD program.  He reports he lives alone however does have a couple of good friends who know he needs to abstain from alcohol.   He also expressed interest in attending AA groups.  Writer has entered into patient's discharge instructions the phone number and web site for [facility]- access point for AA groups.  Entered the instructions for obtaining a Rule 25 assessment and also for the [facility] hospital Financial Counselor.  Patient appreciative and appears motivated to access support for sobriety.  Patient has a good friend who is transporting him home today.

## 2018-09-22 NOTE — DISCHARGE SUMMARY
[facility]    Discharge Summary  Hospitalist    Date of Admission:  9/19/2018  Date of Discharge:  9/22/2018  Discharging Provider: Onur Brown MD  Date of Service (when I saw the patient): 09/22/18    Discharge Diagnoses   Alcohol withdrawal     History of Present Illness   Shabbir Antonio is an 38 year old male who presented with alcohol withdrawal     Hospital Course     Brief Summary:  Shabbir Antonio is a 38 year old male admitted with visual and auditory hallucinations likely due to alcohol withdrawal.  The patient drinks almost daily and has been drinking more than usual over the last few days although he cannot quantify the amount for me.  He stopped drinking a day before admission and became tremulous and started having visual and auditory hallucinations.  He came to the emergency room where he was treated with thiamine and lorazepam and subsequently admitted.      Final Discharge Diagnosis and Hospital Course          Acute alcohol withdrawal syndrome with hallucinations  Alcohol use disorder  Anxiety/depression  No more hallucinations or agitation at this time, overall remain stable.  Evaluated by the Psychiatry and , recommend out patient  CD evaluation and rule 25. Recommend that he should not drink alcohol.  He said he will follow the recommendations.       Mild elevation of liver transaminases likely due to alcohol  Remain stable.      Hypertension  On enalapril, blood pressure reasonable at this time.       DVT Prophylaxis:   Enoxaparin (Lovenox) subcutaneous      Code Status:   Full Code     Overall stable, no more withdrawal symptoms or hallucinations.  He will be discharge home in stable condition.     Onur Brown MD    Significant Results and Procedures       Pending Results   These results will be followed up by PCP  Unresulted Labs Ordered in the Past 30 Days of this Admission     No orders found from 7/21/2018 to 9/20/2018.          Code Status   Full  Code       Primary Care Physician   Physician No Ref-Primary    Physical Exam   Temp: 98.1  F (36.7  C) Temp src: Oral BP: 121/69   Heart Rate: 70 Resp: 16 SpO2: 100 % O2 Device: None (Room air)    Vitals:    09/19/18 2330 09/20/18 0244   Weight: 97.1 kg (214 lb) 97.2 kg (214 lb 4.6 oz)     Vital Signs with Ranges  Temp:  [98.1  F (36.7  C)-98.7  F (37.1  C)] 98.1  F (36.7  C)  Heart Rate:  [70-88] 70  Resp:  [16] 16  BP: (115-131)/(69-87) 121/69  SpO2:  [97 %-100 %] 100 %  I/O last 3 completed shifts:  In: 360 [P.O.:360]  Out: -     Constitutional: awake, alert, cooperative, no apparent distress, and appears stated age  Eyes: Lids and lashes normal, pupils equal, round and reactive to light, extra ocular muscles intact, sclera clear, conjunctiva normal  Hematologic / Lymphatic: no cervical lymphadenopathy  Respiratory: No increased work of breathing, good air exchange, clear to auscultation bilaterally, no crackles or wheezing  Cardiovascular: Normal apical impulse, regular rate and rhythm, normal S1 and S2, no S3 or S4, and no murmur noted  GI: No scars, normal bowel sounds, soft, non-distended, non-tender, no masses palpated, no hepatosplenomegally  Skin: no bruising or bleeding  Neurologic: No focal deficit.     Discharge Disposition   Discharged to home  Condition at discharge: Stable    Consultations This Hospital Stay   PSYCHIATRY IP CONSULT  SOCIAL WORK IP CONSULT    Time Spent on this Encounter   IOnur, personally saw the patient today and spent greater than 30 minutes discharging this patient.    Discharge Orders     Follow-up and recommended labs and tests    Follow up with primary care provider, Physician No Ref-Primary, within 7 days for hospital follow- up.  No follow up labs or test are needed.     Reason for your hospital stay   Admitted with alcohol withdrawal     Activity   Your activity upon discharge: activity as tolerated     Discharge Instructions   Do not drink alcohol, follow up  with Chemical dependency as per social service instruction     Full Code     Diet   Follow this diet upon discharge: Orders Placed This Encounter     Combination Diet Regular Diet Adult       Discharge Medications   Current Discharge Medication List      CONTINUE these medications which have NOT CHANGED    Details   enalapril (VASOTEC) 20 MG tablet Take 20 mg by mouth daily      FLUoxetine (PROZAC) 10 MG capsule Take 10 mg by mouth daily           Allergies   No Known Allergies  Data   Most Recent 3 CBC's:  Recent Labs   Lab Test  09/22/18   0805 09/21/18   0855 09/20/18   0847  09/19/18   2349   WBC   --   6.9   --   11.8*   HGB   --   14.3   --   16.5   MCV   --   95   --   95   PLT  139*  140*  129*  200      Most Recent 3 BMP's:  Recent Labs   Lab Test  09/22/18   0805 09/21/18   0855  09/20/18   0847  09/19/18   2349   NA   --   139  141  137   POTASSIUM  4.2  3.8  3.6  3.5   CHLORIDE   --   104  105  101   CO2   --   26  29  27   BUN   --   9   --   9   CR   --   1.02  1.04  1.10   ANIONGAP   --   9  7  9   CYRIL   --   8.5   --   9.1   GLC   --   146*   --   109*     Most Recent 2 LFT's:  Recent Labs   Lab Test  09/21/18   0855  09/19/18   2349   AST  81*  67*   ALT  86*  74*   ALKPHOS  52  62   BILITOTAL  0.7  0.9     Most Recent INR's and Anticoagulation Dosing History:  Anticoagulation Dose History     There is no flowsheet data to display.        Most Recent 3 Troponin's:No lab results found.  Most Recent Cholesterol Panel:No lab results found.  Most Recent 6 Bacteria Isolates From Any Culture (See EPIC Reports for Culture Details):No lab results found.  Most Recent TSH, T4 and A1c Labs:  Recent Labs   Lab Test  09/19/18   2349   TSH  7.13*   T4  0.84     Results for orders placed or performed during the hospital encounter of 09/19/18   XR Chest 2 Views    Narrative    CHEST TWO VIEWS  9/20/2018 1:25 PM     HISTORY: HTN.    COMPARISON: None.     FINDINGS:  There are no acute infiltrates. The cardiac  silhouette is  not enlarged. Pulmonary vasculature is unremarkable.       Impression    IMPRESSION: No acute disease.    GUERO HERNANDEZ MD

## 2018-09-23 LAB — INTERPRETATION ECG - MUSE: NORMAL
